# Patient Record
Sex: MALE | Race: AMERICAN INDIAN OR ALASKA NATIVE
[De-identification: names, ages, dates, MRNs, and addresses within clinical notes are randomized per-mention and may not be internally consistent; named-entity substitution may affect disease eponyms.]

---

## 2018-04-13 ENCOUNTER — HOSPITAL ENCOUNTER (EMERGENCY)
Dept: HOSPITAL 5 - ED | Age: 23
LOS: 1 days | Discharge: HOME | End: 2018-04-14
Payer: SELF-PAY

## 2018-04-13 VITALS — SYSTOLIC BLOOD PRESSURE: 119 MMHG | DIASTOLIC BLOOD PRESSURE: 74 MMHG

## 2018-04-13 DIAGNOSIS — F17.200: ICD-10-CM

## 2018-04-13 DIAGNOSIS — R51: ICD-10-CM

## 2018-04-13 DIAGNOSIS — A08.4: Primary | ICD-10-CM

## 2018-04-13 LAB
BASOPHILS # (AUTO): 0 K/MM3 (ref 0–0.1)
BASOPHILS NFR BLD AUTO: 0.7 % (ref 0–1.8)
BILIRUB UR QL STRIP: (no result)
BLOOD UR QL VISUAL: (no result)
BUN SERPL-MCNC: 12 MG/DL (ref 9–20)
BUN/CREAT SERPL: 13 %
CALCIUM SERPL-MCNC: 8.9 MG/DL (ref 8.4–10.2)
EOSINOPHIL # BLD AUTO: 0.1 K/MM3 (ref 0–0.4)
EOSINOPHIL NFR BLD AUTO: 1.4 % (ref 0–4.3)
HCT VFR BLD CALC: 43.6 % (ref 35.5–45.6)
HEMOLYSIS INDEX: 11
HGB BLD-MCNC: 14.9 GM/DL (ref 11.8–15.2)
LYMPHOCYTES # BLD AUTO: 1.9 K/MM3 (ref 1.2–5.4)
LYMPHOCYTES NFR BLD AUTO: 30.4 % (ref 13.4–35)
MCH RBC QN AUTO: 28 PG (ref 28–32)
MCHC RBC AUTO-ENTMCNC: 34 % (ref 32–34)
MCV RBC AUTO: 82 FL (ref 84–94)
MONOCYTES # (AUTO): 0.5 K/MM3 (ref 0–0.8)
MONOCYTES % (AUTO): 8.1 % (ref 0–7.3)
MUCOUS THREADS #/AREA URNS HPF: (no result) /HPF
PH UR STRIP: 6 [PH] (ref 5–7)
PLATELET # BLD: 278 K/MM3 (ref 140–440)
RBC # BLD AUTO: 5.29 M/MM3 (ref 3.65–5.03)
RBC #/AREA URNS HPF: 3 /HPF (ref 0–6)
UROBILINOGEN UR-MCNC: 2 MG/DL (ref ?–2)
WBC #/AREA URNS HPF: 2 /HPF (ref 0–6)

## 2018-04-13 PROCEDURE — 80048 BASIC METABOLIC PNL TOTAL CA: CPT

## 2018-04-13 PROCEDURE — 85025 COMPLETE CBC W/AUTO DIFF WBC: CPT

## 2018-04-13 PROCEDURE — 36415 COLL VENOUS BLD VENIPUNCTURE: CPT

## 2018-04-13 PROCEDURE — 96361 HYDRATE IV INFUSION ADD-ON: CPT

## 2018-04-13 PROCEDURE — 96374 THER/PROPH/DIAG INJ IV PUSH: CPT

## 2018-04-13 PROCEDURE — 99283 EMERGENCY DEPT VISIT LOW MDM: CPT

## 2018-04-13 PROCEDURE — 81001 URINALYSIS AUTO W/SCOPE: CPT

## 2018-04-13 PROCEDURE — 96372 THER/PROPH/DIAG INJ SC/IM: CPT

## 2018-04-14 NOTE — EMERGENCY DEPARTMENT REPORT
ED N/V/D HPI





- General


Chief complaint: Nausea/Vomiting/Diarrhea


Stated complaint: N/V, BODYACHE


Time Seen by Provider: 04/13/18 22:31


Source: patient


Mode of arrival: Ambulatory


Limitations: No Limitations





- History of Present Illness


Initial comments: 





Patient is a 22-year-old American male who is presenting with nausea vomiting 

diarrhea since yesterday.  Patient also states he has a headache that is 3 out 

of 10 in severity.  Patient states she's been vomiting a sed diarrhea and with 

the vomiting he has some rib pain with the retching.  Patient states now it is 

just dry heaving.  Patient denies any fever or cough congestion at this time.  

Patient denies any sick contacts





- Related Data


 Previous Rx's











 Medication  Instructions  Recorded  Last Taken  Type


 


Dicyclomine [Bentyl] 20 mg PO QID #12 tablet 04/14/18 Unknown Rx


 


Ondansetron [Zofran Odt] 4 mg PO Q8HR PRN #10 tab.rapdis 04/14/18 Unknown Rx


 


oxyCODONE /ACETAMINOPHEN [Percocet 1 tab PO Q6HR PRN #12 tablet 04/14/18 

Unknown Rx





5/325]    











 Allergies











Allergy/AdvReac Type Severity Reaction Status Date / Time


 


No Known Allergies Allergy   Unverified 04/13/18 20:47














ED Review of Systems


ROS: 


Stated complaint: N/V, BODYACHE


Other details as noted in HPI





Comment: All other systems reviewed and negative





ED Past Medical Hx





- Past Medical History


Previous Medical History?: No





- Surgical History


Additional Surgical History: Hernia repair





- Social History


Smoking Status: Current Every Day Smoker


Substance Use Type: None





- Medications


Home Medications: 


 Home Medications











 Medication  Instructions  Recorded  Confirmed  Last Taken  Type


 


Dicyclomine [Bentyl] 20 mg PO QID #12 tablet 04/14/18  Unknown Rx


 


Ondansetron [Zofran Odt] 4 mg PO Q8HR PRN #10 tab.rapdis 04/14/18  Unknown Rx


 


oxyCODONE /ACETAMINOPHEN [Percocet 1 tab PO Q6HR PRN #12 tablet 04/14/18  

Unknown Rx





5/325]     














ED Physical Exam





- General


Limitations: No Limitations


General appearance: alert, in no apparent distress





- Head


Head exam: Present: atraumatic, normocephalic





- Eye


Eye exam: Present: normal appearance





- ENT


ENT exam: Present: mucous membranes moist





- Neck


Neck exam: Present: normal inspection





- Respiratory


Respiratory exam: Present: normal lung sounds bilaterally.  Absent: respiratory 

distress





- Cardiovascular


Cardiovascular Exam: Present: regular rate, normal rhythm.  Absent: systolic 

murmur, diastolic murmur, rubs, gallop





- GI/Abdominal


GI/Abdominal exam: Present: soft, normal bowel sounds





- Rectal


Rectal exam: Present: deferred





- Extremities Exam


Extremities exam: Present: normal inspection





- Back Exam


Back exam: Present: normal inspection





- Neurological Exam


Neurological exam: Present: alert, oriented X3





- Psychiatric


Psychiatric exam: Present: normal affect, normal mood





- Skin


Skin exam: Present: warm, dry, intact, normal color.  Absent: rash





ED Course





 Vital Signs











  04/13/18 04/13/18





  20:42 23:23


 


Temperature 98 F 97.7 F


 


Pulse Rate 88 79


 


Respiratory 18 18





Rate  


 


Blood Pressure 126/93 


 


Blood Pressure  119/74





[Left]  


 


O2 Sat by Pulse 98 





Oximetry  














ED Medical Decision Making





- Lab Data


Result diagrams: 


 04/13/18 20:54





 04/13/18 20:54





- Medical Decision Making





Patient was hydrated and given Zofran and Bentyl patient is feeling better at 

this time.  Patient be discharged home


Critical care attestation.: 


If time is entered above; I have spent that time in minutes in the direct care 

of this critically ill patient, excluding procedure time.








ED Disposition


Clinical Impression: 


 Viral gastroenteritis





Disposition: DC-01 TO HOME OR SELFCARE


Is pt being admited?: No


Does the pt Need Aspirin: No


Condition: Stable


Instructions:  Gastroenteritis (ED)


Referrals: 


ANGIE BEATTY MD [Primary Care Provider] - 3-5 Days

## 2019-03-16 ENCOUNTER — HOSPITAL ENCOUNTER (EMERGENCY)
Dept: HOSPITAL 5 - ED | Age: 24
Discharge: HOME | End: 2019-03-16
Payer: COMMERCIAL

## 2019-03-16 PROCEDURE — 99282 EMERGENCY DEPT VISIT SF MDM: CPT

## 2019-03-16 NOTE — EMERGENCY DEPARTMENT REPORT
ED ENT HPI





- General


Chief complaint: Earache


Stated complaint: POSS EAR INFECTION


Time Seen by Provider: 03/16/19 10:42


Source: patient


Mode of arrival: Ambulatory


Limitations: No Limitations





- History of Present Illness


Initial comments: 





Pt presents to the ED with c/o right sided ear pain that began 2-3 days ago. He 

denies any drainage from the ear or fever. He has never had a ear infection 

previously. The patient has taken about 5-6 pills of amoxicillin due to tooth 

extraction on the left lower side. He states he has not been taking them as 

prescribed. 





- Related Data


                                  Previous Rx's











 Medication  Instructions  Recorded  Last Taken  Type


 


Dicyclomine [Bentyl] 20 mg PO QID #12 tablet 04/14/18 Unknown Rx


 


Ondansetron [Zofran Odt] 4 mg PO Q8HR PRN #10 tab.rapdis 04/14/18 Unknown Rx


 


oxyCODONE /ACETAMINOPHEN [Percocet 1 tab PO Q6HR PRN #12 tablet 04/14/18 Unknown

 Rx





5/325 mg]    


 


Amoxicillin/K Clav Tab [Augmentin 1 each PO BID 7 Days #14 tablet 03/16/19 

Unknown Rx





500 MG TAB]    











                                    Allergies











Allergy/AdvReac Type Severity Reaction Status Date / Time


 


No Known Allergies Allergy   Verified 03/16/19 10:14














ED Dental HPI





- General


Chief complaint: Earache


Stated complaint: POSS EAR INFECTION


Time Seen by Provider: 03/16/19 10:42


Source: patient


Mode of arrival: Ambulatory


Limitations: No Limitations





- Related Data


                                  Previous Rx's











 Medication  Instructions  Recorded  Last Taken  Type


 


Dicyclomine [Bentyl] 20 mg PO QID #12 tablet 04/14/18 Unknown Rx


 


Ondansetron [Zofran Odt] 4 mg PO Q8HR PRN #10 tab.rapdis 04/14/18 Unknown Rx


 


oxyCODONE /ACETAMINOPHEN [Percocet 1 tab PO Q6HR PRN #12 tablet 04/14/18 Unknown

 Rx





5/325 mg]    


 


Amoxicillin/K Clav Tab [Augmentin 1 each PO BID 7 Days #14 tablet 03/16/19 

Unknown Rx





500 MG TAB]    











                                    Allergies











Allergy/AdvReac Type Severity Reaction Status Date / Time


 


No Known Allergies Allergy   Verified 03/16/19 10:14














ED Review of Systems


ROS: 


Stated complaint: POSS EAR INFECTION


Other details as noted in HPI





Comment: All other systems reviewed and negative





ED Past Medical Hx





- Past Medical History


Previous Medical History?: No





- Surgical History


Additional Surgical History: Hernia repair





- Social History


Smoking Status: Current Every Day Smoker


Substance Use Type: Alcohol





- Medications


Home Medications: 


                                Home Medications











 Medication  Instructions  Recorded  Confirmed  Last Taken  Type


 


Dicyclomine [Bentyl] 20 mg PO QID #12 tablet 04/14/18  Unknown Rx


 


Ondansetron [Zofran Odt] 4 mg PO Q8HR PRN #10 tab.rapdis 04/14/18  Unknown Rx


 


oxyCODONE /ACETAMINOPHEN [Percocet 1 tab PO Q6HR PRN #12 tablet 04/14/18  

Unknown Rx





5/325 mg]     


 


Amoxicillin/K Clav Tab [Augmentin 1 each PO BID 7 Days #14 tablet 03/16/19  

Unknown Rx





500 MG TAB]     














ED Physical Exam





- General


Limitations: No Limitations


General appearance: alert, in no apparent distress





- Head


Head exam: Present: atraumatic, normocephalic





- Eye


Eye exam: Present: normal appearance





- ENT


ENT exam: Present: mucous membranes moist, other (L TM is normal, Right TM with 

purulence and bulging, TM is intact no perforation, bilateral ear canals are 

normal, negative tug test bilaterally )





- Respiratory


Respiratory exam: Absent: respiratory distress





- Cardiovascular


Cardiovascular Exam: Present: regular rate





- Neurological Exam


Neurological exam: Present: alert, oriented X3





- Psychiatric


Psychiatric exam: Present: normal affect, normal mood





ED Course





                                   Vital Signs











  03/16/19





  10:17


 


Temperature 97.7 F


 


Pulse Rate 87


 


Respiratory 20





Rate 


 


Blood Pressure 136/88


 


O2 Sat by Pulse 99





Oximetry 














ED Medical Decision Making





- Medical Decision Making





Pt presents with right sided otitis media. Began having ear pain 2-3 days ago. 

No fever, no drainage. Has taken 5-6 pills of amoxicillin for a dental 

extraction on the left lower side. He has not been taking the amoxicillin as 

prescribed by the dentist. Advised to please take all augmentin as prescribed. 

Follow up with primary care doctor in the next 2-3 days. Return to the ED for 

any new or worsening symptoms. 


Critical care attestation.: 


If time is entered above; I have spent that time in minutes in the direct care 

of this critically ill patient, excluding procedure time.








ED Disposition


Clinical Impression: 


Otitis media


Qualifiers:


 Otitis media type: suppurative Chronicity: acute Laterality: right Recurrence: 

non-recurrent Spontaneous tympanic membrane rupture: without spontaneous rupture

Qualified Code(s): H66.001 - Acute suppurative otitis media without spontaneous 

rupture of ear drum, right ear





Disposition: DC-01 TO HOME OR SELFCARE


Is pt being admited?: No


Does the pt Need Aspirin: No


Condition: Stable


Instructions:  Otitis Media (ED)


Additional Instructions: 


Follow up with your primary care doctor in the next 2-3 days. Please take all 

medication as prescribed. Can take tylenol or motrin as needed for pain. Return 

to the emergency room for any new or worsening symptoms. 


Prescriptions: 


Amoxicillin/K Clav Tab [Augmentin 500 MG TAB] 1 each PO BID 7 Days #14 tablet


Referrals: 


[facility] [Provider Group] - 2-3 Days


Time of Disposition: 10:53


Print Language: ENGLISH

## 2019-10-15 ENCOUNTER — HOSPITAL ENCOUNTER (EMERGENCY)
Dept: HOSPITAL 5 - ED | Age: 24
Discharge: HOME | End: 2019-10-15
Payer: SELF-PAY

## 2019-10-15 VITALS — DIASTOLIC BLOOD PRESSURE: 77 MMHG | SYSTOLIC BLOOD PRESSURE: 125 MMHG

## 2019-10-15 DIAGNOSIS — Z79.899: ICD-10-CM

## 2019-10-15 DIAGNOSIS — F17.200: ICD-10-CM

## 2019-10-15 DIAGNOSIS — G43.009: Primary | ICD-10-CM

## 2019-10-15 PROCEDURE — 70450 CT HEAD/BRAIN W/O DYE: CPT

## 2019-10-15 PROCEDURE — 99283 EMERGENCY DEPT VISIT LOW MDM: CPT

## 2019-10-15 PROCEDURE — 96372 THER/PROPH/DIAG INJ SC/IM: CPT

## 2019-10-15 NOTE — EMERGENCY DEPARTMENT REPORT
ED Headache HPI





- General


Chief Complaint: Headache


Stated Complaint: MIGRAINE


Time Seen by Provider: 10/15/19 06:42


Source: patient


Exam Limitations: no limitations





- History of Present Illness


Initial Comments: 





Patient is a 24-year-old -American male with a history of migraine 

headaches who presents to the ED with acute exacerbation of his chronic 

migraines head headache for the last 2 days.  Patient also complains of nausea, 

vomiting and photophobia.  Patient denies dizziness, syncope, fever, chills, 

nasal and sinus congestion, cough, sore throat, neck pain, change in vision, 

chest pain or shortness of breath.  Patient states that he has been taking 

over-the-counter medication with no relief.


Timing/Duration: 24 hours


Quality: moderate, sharp


Head Injury Location: global


Recent Head Trauma: no recent headache/trauma, frequent headaches


Associated Symptoms: denies symptoms, nausea/vomiting.  denies: confusion, 

fatigue, facial pain, fever/chills, flushing, loss of consciousness, nasal 

congestion, nasal drainage, numbness in legs/feet, sinus infection, stiff neck, 

vision changes, weakness, other


Allergies/Adverse Reactions: 


Allergies





No Known Allergies Allergy (Verified 03/16/19 10:14)


   








Home Medications: 


Ambulatory Orders





Dicyclomine [Bentyl] 20 mg PO QID #12 tablet 04/14/18 


Ondansetron [Zofran Odt] 4 mg PO Q8HR PRN #10 tab.rapdis 04/14/18 


oxyCODONE /ACETAMINOPHEN [Percocet 5/325 mg] 1 tab PO Q6HR PRN #12 tablet 

04/14/18 


Amoxicillin/K Clav Tab [Augmentin 500 MG TAB] 1 each PO BID 7 Days #14 tablet 

03/16/19 


Butalb/Acetamin/Caff -40 [Fioricet -40] 1 tab PO Q6HR PRN #12 tab 

10/15/19 


Cyclobenzaprine [Flexeril] 10 mg PO Q8H PRN #15 tablet 10/15/19 


Ketorolac [Toradol] 10 mg PO Q8H PRN #20 tablet 10/15/19 


Promethazine [Phenergan] 25 mg PO Q6HR PRN #24 tab 10/15/19 











ED Review of Systems


ROS: 


Stated complaint: MIGRAINE


Other details as noted in HPI





Constitutional: denies: chills, fever


Eyes: denies: eye pain, eye discharge, vision change


ENT: denies: ear pain, throat pain


Respiratory: denies: cough, shortness of breath, wheezing


Cardiovascular: denies: chest pain, palpitations


Endocrine: no symptoms reported


Gastrointestinal: nausea, vomiting.  denies: abdominal pain, diarrhea


Genitourinary: denies: urgency, dysuria


Musculoskeletal: denies: back pain, joint swelling, arthralgia


Skin: denies: rash, lesions


Neurological: headache.  denies: weakness, paresthesias


Psychiatric: denies: anxiety, depression


Hematological/Lymphatic: denies: easy bleeding, easy bruising





ED Past Medical Hx





- Past Medical History


Previous Medical History?: No





- Surgical History


Past Surgical History?: Yes


Additional Surgical History: Hernia repair





- Social History


Smoking Status: Current Every Day Smoker


Substance Use Type: Alcohol





- Medications


Home Medications: 


                                Home Medications











 Medication  Instructions  Recorded  Confirmed  Last Taken  Type


 


Dicyclomine [Bentyl] 20 mg PO QID #12 tablet 04/14/18  Unknown Rx


 


Ondansetron [Zofran Odt] 4 mg PO Q8HR PRN #10 tab.rapdis 04/14/18  Unknown Rx


 


oxyCODONE /ACETAMINOPHEN [Percocet 1 tab PO Q6HR PRN #12 tablet 04/14/18  

Unknown Rx





5/325 mg]     


 


Amoxicillin/K Clav Tab [Augmentin 1 each PO BID 7 Days #14 tablet 03/16/19  

Unknown Rx





500 MG TAB]     


 


Butalb/Acetamin/Caff -40 1 tab PO Q6HR PRN #12 tab 10/15/19  Unknown Rx





[Fioricet -40]     


 


Cyclobenzaprine [Flexeril] 10 mg PO Q8H PRN #15 tablet 10/15/19  Unknown Rx


 


Ketorolac [Toradol] 10 mg PO Q8H PRN #20 tablet 10/15/19  Unknown Rx


 


Promethazine [Phenergan] 25 mg PO Q6HR PRN #24 tab 10/15/19  Unknown Rx














ED Physical Exam





- General


Limitations: No Limitations


General appearance: alert, in no apparent distress





- Head


Head exam: Present: atraumatic, normocephalic, normal inspection





- Eye


Eye exam: Present: normal appearance, PERRL, EOMI


Pupils: Present: normal accommodation





- ENT


ENT exam: Present: normal exam, normal orophraynx, mucous membranes moist, TM's 

normal bilaterally, normal external ear exam





- Neck


Neck exam: Present: normal inspection, full ROM





- Respiratory


Respiratory exam: Present: normal lung sounds bilaterally.  Absent: respiratory 

distress, wheezes, chest wall tenderness, accessory muscle use, decreased breath

 sounds, prolonged expiratory





- Cardiovascular


Cardiovascular Exam: Present: regular rate, normal rhythm, normal heart sounds. 

 Absent: systolic murmur, diastolic murmur, rubs, gallop





- GI/Abdominal


GI/Abdominal exam: Present: soft, normal bowel sounds.  Absent: tenderness, 

guarding, rebound, hyperactive bowel sounds, hypoactive bowel sounds, 

organomegaly





- Rectal


Rectal exam: Present: deferred





- Extremities Exam


Extremities exam: Present: normal inspection, full ROM, normal capillary refill





- Back Exam


Back exam: Present: normal inspection, full ROM.  Absent: tenderness, muscle 

spasm, paraspinal tenderness





- Neurological Exam


Neurological exam: Present: alert, oriented X3, CN II-XII intact, normal gait, 

reflexes normal





- Psychiatric


Psychiatric exam: Present: normal affect, normal mood





- Skin


Skin exam: Present: warm, dry, intact, normal color.  Absent: rash





ED Course





                                   Vital Signs











  10/15/19





  02:14


 


Temperature 98.9 F


 


Pulse Rate 87


 


Respiratory 18





Rate 


 


Blood Pressure 180/118


 


O2 Sat by Pulse 95





Oximetry 














- Reevaluation(s)


Reevaluation #1: 





10/15/19 06:50


This is a 24-year-old male with a history of chronic migraine headaches who 

presents to the ED with acute exacerbation of his chronic migraine headaches 

with nausea and vomiting and photophobia for the last 2 days.  In the ED, 

patient is alert and oriented 3 and is not in distress but appears to be in 

pain.  Patient was treated in the ED for pain and also nausea and vomiting.  

Head CT scan without contrast shows no acute intracranial abnormalities or 

hemorrhage.  On reevaluation, patient's pain is well controlled with 

medications, patient sleeping comfortably in the room in no distress.  Patient 

was discharged home on medications and advised to follow-up with his primary 

care physician in 5-7 days for reevaluation.  Patient was also advised to return

 to the ED immediately if symptoms get worse. 





ED Medical Decision Making





- Medical Decision Making





This is a 24-year-old male with a history of chronic migraine headaches who 

presents to the ED with acute exacerbation of his chronic migraine headaches 

with nausea and vomiting and photophobia for the last 2 days.  In the ED, 

patient is alert and oriented 3 and is not in distress but appears to be in 

pain.  Patient was treated in the ED for pain and also nausea and vomiting.  

Head CT scan without contrast shows no acute intracranial abnormalities or 

hemorrhage.  On reevaluation, patient's pain is well controlled with 

medications, patient sleeping comfortably in the room in no distress.  Patient 

was discharged home on medications and advised to follow-up with his primary 

care physician in 5-7 days for reevaluation.  Patient was also advised to return

 to the ED immediately if symptoms get worse





- Differential Diagnosis


Migraine headache; SAH; Nausea, vomiting; Hypertension; Sinusitis


Critical care attestation.: 


If time is entered above; I have spent that time in minutes in the direct care 

of this critically ill patient, excluding procedure time.








ED Disposition


Clinical Impression: 


 Nausea and vomiting in adult





Migraine headache without aura


Qualifiers:


 Status migrainosus presence: without status migrainosus Intractability: not 

intractable Qualified Code(s): G43.009 - Migraine without aura, not intractable,

 without status migrainosus





Disposition: DC-01 TO HOME OR SELFCARE


Is pt being admited?: No


Does the pt Need Aspirin: No


Condition: Stable


Instructions:  Migraine Headache (ED), Acute Nausea and Vomiting (ED)


Additional Instructions: 


Take medication with food, drink plenty of fluids and follow-up with your 

primary care physician in 7-10 days for reevaluation.  Return to the ED 

immediately if symptoms get worse.


Prescriptions: 


Butalb/Acetamin/Caff -40 [Fioricet -40] 1 tab PO Q6HR PRN #12 tab


 PRN Reason: Headache


Cyclobenzaprine [Flexeril] 10 mg PO Q8H PRN #15 tablet


 PRN Reason: Muscle Spasm


Promethazine [Phenergan] 25 mg PO Q6HR PRN #24 tab


 PRN Reason: Nausea


Ketorolac [Toradol] 10 mg PO Q8H PRN #20 tablet


 PRN Reason: Pain


Referrals: 


PRIMARY CARE,MD [Primary Care Provider] - 3-5 Days


Forms:  Work/School Release Form(ED)


Time of Disposition: 06:44


Print Language: ENGLISH

## 2019-10-15 NOTE — CAT SCAN REPORT
CT head/brain wo con 



INDICATION / CLINICAL INFORMATION:

headache with high bp.



TECHNIQUE:

All CT scans at this location are performed using CT dose reduction for ALARA by means of automated e
xposure control. 



COMPARISON:

None available.



FINDINGS:

No intracranial hemorrhage.

No abnormal extra-axial fluid collections.

The ventricular system and basilar cisterns are normal.

No mass effect.

No acute sinus disease or skeletal abnormality.



IMPRESSION:

1. Negative noncontrast head CT.



Signer Name: Cas James MD 

Signed: 10/15/2019 3:08 AM

 Workstation Name: [id number]

## 2022-04-15 ENCOUNTER — DASHBOARD ENCOUNTERS (OUTPATIENT)
Age: 27
End: 2022-04-15

## 2022-04-27 ENCOUNTER — OFFICE VISIT (OUTPATIENT)
Dept: URBAN - METROPOLITAN AREA CLINIC 92 | Facility: CLINIC | Age: 27
End: 2022-04-27